# Patient Record
Sex: MALE | Employment: FULL TIME | URBAN - METROPOLITAN AREA
[De-identification: names, ages, dates, MRNs, and addresses within clinical notes are randomized per-mention and may not be internally consistent; named-entity substitution may affect disease eponyms.]

---

## 2023-06-07 ENCOUNTER — MEDICAL CORRESPONDENCE (OUTPATIENT)
Dept: HEALTH INFORMATION MANAGEMENT | Facility: CLINIC | Age: 48
End: 2023-06-07
Payer: COMMERCIAL

## 2023-06-09 ENCOUNTER — MEDICAL CORRESPONDENCE (OUTPATIENT)
Dept: HEALTH INFORMATION MANAGEMENT | Facility: CLINIC | Age: 48
End: 2023-06-09
Payer: COMMERCIAL

## 2023-06-13 ENCOUNTER — TRANSCRIBE ORDERS (OUTPATIENT)
Dept: OTHER | Age: 48
End: 2023-06-13

## 2023-06-13 DIAGNOSIS — I35.0 NONRHEUMATIC AORTIC VALVE STENOSIS: Primary | ICD-10-CM

## 2023-06-13 DIAGNOSIS — I10 HTN (HYPERTENSION): ICD-10-CM

## 2023-06-13 DIAGNOSIS — E78.5 DYSLIPIDEMIA: ICD-10-CM

## 2023-06-13 DIAGNOSIS — I35.0 MODERATE AORTIC VALVE STENOSIS: ICD-10-CM

## 2023-06-13 DIAGNOSIS — Q23.81 BICUSPID AORTIC VALVE: ICD-10-CM

## 2023-08-12 NOTE — PROGRESS NOTES
HPI and Plan:     Reason for Consultation: Aortic stenosis.      History of Presenting Illness: This is a 48-year-old gentleman who presents here for second opinion regarding aortic stenosis.  This was diagnosed in 2022 at which point he underwent an evaluation at [facility].      He underwent a comprehensive cardiac evaluation in early 2022 and was found to have a functionally bicuspid aortic valve on LARRY with severe aortic stenosis and a peak velocity of approximately 5 m/s in the setting of normal left ventricular systolic function.  Moderate regurgitation was also noted.  Of note, his aortic root and ascending thoracic aorta were reported as normal in size.     He also underwent coronary angiography on 1/17/2022 and was found to have nonobstructive coronary artery disease with moderate circumflex disease.  This was a left dominant circulation.    In reviewing the notes from [facility], he had a detailed discussion in February 2022 with cardiology regarding the options for aortic valve replacement.  Given his age, surgical consultation and mechanical aortic valve replacement was recommended.  The patient ultimately decided to pursue watchful waiting, and has been followed up at [facility] in [place] on a regular scheduled basis.      At the time of consultation I did not have the [place] records to review.  Per the patient and his significant other, however, he has undergone echocardiography as recently as in April of this year and his aortic valve stenosis appears to be stable.    He does complain of exertional dyspnea that has been stable for the past several years.  He describes dyspnea upon climbing stairs or performing anything more than mild levels of activity.  The symptoms do not appear to have progressed recently but they do limit his overall exercise capacity.  He denies any syncope, palpitations, PND orthopnea.  He does have sleep apnea and has been using CPAP for the past month or  so.      PMH, FH, SH, Allergies and Meds: As outlined below.    Physical Exam:    Gen: NAD  Respiratory : Clear to Auscultation.  Cardiovascular: RRR, 3/6 systolic ejection murmur at the right upper sternal border.  Extremities: No edema.      Labs: Labs from [facility] on 6/7/2023 were reviewed.  A basic metabolic panel was within normal limits with exception of an elevated creatinine of 1.37.      Lipids demonstrated a total cholesterol of 105, LDL of 53 and triglycerides of 98.  A1c was 6.0.  A CBC demonstrated a hemoglobin of 12.3 and platelets of 223.      He has also undergone a chest x-ray on 6/7/2023 which was within normal limits.    Other Cardiac Testing:          IMPRESSION:    Bicuspid aortic valve with severe aortic stenosis based on a comprehensive cardiac evaluation in early 2022 as described above.    PLAN:    Mr. Talley presents for a second opinion regarding a diagnosis of severe bicuspid aortic valve stenosis that was established back in early 2022.      I went over the pathophysiology and treatment options for bicuspid aortic valve stenosis with him in detail.  Based on his evaluation at [facility], he has severe aortic stenosis which appears to be symptomatic based on his exertional dyspnea.  As we discussed, aortic valve replacement would be a class I indication in this setting.  Given his age and bicuspid aortic valve anatomy, a mechanical aortic valve would be the most appropriate choice and I would not recommend TAVR.    At this point in time we have agreed to reassess his aortic valve stenosis with an echocardiogram.  A CT of the chest will also be performed to assess aortic dimensions.  If his echocardiogram confirms severe aortic stenosis, but I think that diagnostic coronary angiography and surgical referral would be appropriate.    After our discussion the patient expressed his understanding and agreement.  I did discuss the indications, risks and benefits of diagnostic coronary  angiography in this setting with the patient in detail as well and he is agreeable with proceeding if this is indicated.    It was a pleasure seeing him today.    Over 60 minutes today spent pre and post charting, reviewing pertinent previous records as well as imaging studies personally and discussing and coordinating further evaluation for the patient's symptoms.           Ernesto Fox M.D.       CURRENT MEDICATIONS:  Current Outpatient Medications   Medication Sig Dispense Refill    amLODIPine (NORVASC) 10 MG tablet Take 10 mg by mouth daily      aspirin (ASA) 81 MG chewable tablet 81 mg Taking every other day      cholecalciferol 50 MCG (2000 UT) CAPS Take 4,000 Units by mouth      co-enzyme Q-10 100 MG CAPS capsule Take 2 capsules by mouth every evening      Cyanocobalamin 1000 MCG CAPS Take 1,000 mcg by mouth      cyclobenzaprine (FLEXERIL) 10 MG tablet Take 1 Tablet by mouth three times a day as needed for Muscle Spasms or Pain.      ferrous sulfate (FEROSUL) 325 (65 Fe) MG tablet Take 1 tablet by mouth daily      folic acid (FOLVITE) 1 MG tablet Take 1,000 mcg by mouth daily      gabapentin (NEURONTIN) 300 MG capsule Take 300 mg by mouth      lisinopril (ZESTRIL) 20 MG tablet Take 20 mg by mouth daily      metFORMIN (GLUCOPHAGE XR) 500 MG 24 hr tablet Take 1 Tablet by mouth one time a day. Swallow tablet whole; do not crush, divide or chew.      OZEMPIC, 1 MG/DOSE, 4 MG/3ML pen Inject 1 mg Subcutaneous every 7 days      rosuvastatin (CRESTOR) 20 MG tablet Take 20 mg by mouth daily         ALLERGIES   No Known Allergies    PAST MEDICAL HISTORY:    HTN    YOANA    PAST SURGICAL HISTORY:  No past surgical history on file.    FAMILY HISTORY:    + Aortic stenosis    SOCIAL HISTORY:  Social History     Socioeconomic History    Marital status: Patient Declined       Review of Systems:  Skin:  Negative       Eyes:  Negative      ENT:  Negative      Respiratory:  Negative       Cardiovascular:  Negative       Gastroenterology: Negative      Genitourinary:  Negative      Musculoskeletal:  Negative      Neurologic:  Negative      Psychiatric:  Negative      Heme/Lymph/Imm:  Negative      Endocrine:  Positive for diabetes

## 2023-08-14 ENCOUNTER — OFFICE VISIT (OUTPATIENT)
Dept: CARDIOLOGY | Facility: CLINIC | Age: 48
End: 2023-08-14
Payer: COMMERCIAL

## 2023-08-14 ENCOUNTER — TELEPHONE (OUTPATIENT)
Dept: CARDIOLOGY | Facility: CLINIC | Age: 48
End: 2023-08-14

## 2023-08-14 ENCOUNTER — HOSPITAL ENCOUNTER (OUTPATIENT)
Dept: CARDIOLOGY | Facility: CLINIC | Age: 48
Discharge: HOME | End: 2023-08-14
Attending: INTERNAL MEDICINE
Payer: COMMERCIAL

## 2023-08-14 VITALS
OXYGEN SATURATION: 97 % | WEIGHT: 307 LBS | HEART RATE: 60 BPM | DIASTOLIC BLOOD PRESSURE: 88 MMHG | BODY MASS INDEX: 43.95 KG/M2 | SYSTOLIC BLOOD PRESSURE: 148 MMHG | HEIGHT: 70 IN

## 2023-08-14 DIAGNOSIS — I35.0 NONRHEUMATIC AORTIC (VALVE) STENOSIS: ICD-10-CM

## 2023-08-14 DIAGNOSIS — I35.0 NONRHEUMATIC AORTIC (VALVE) STENOSIS: Primary | ICD-10-CM

## 2023-08-14 DIAGNOSIS — Q23.81 BICUSPID AORTIC VALVE: Primary | ICD-10-CM

## 2023-08-14 PROBLEM — E55.9 VITAMIN D DEFICIENCY: Status: ACTIVE | Noted: 2022-08-31

## 2023-08-14 PROBLEM — E78.5 DYSLIPIDEMIA: Status: ACTIVE | Noted: 2022-08-30

## 2023-08-14 PROBLEM — G47.33 MODERATE OBSTRUCTIVE SLEEP APNEA: Status: ACTIVE | Noted: 2022-04-15

## 2023-08-14 PROBLEM — G56.03 BILATERAL CARPAL TUNNEL SYNDROME: Status: ACTIVE | Noted: 2023-06-13

## 2023-08-14 PROBLEM — M54.12 CERVICAL RADICULOPATHY: Status: ACTIVE | Noted: 2021-12-12

## 2023-08-14 PROBLEM — N18.31 STAGE 3A CHRONIC KIDNEY DISEASE (H): Status: ACTIVE | Noted: 2022-08-30

## 2023-08-14 PROBLEM — E11.9 TYPE 2 DIABETES MELLITUS WITHOUT COMPLICATION, WITHOUT LONG-TERM CURRENT USE OF INSULIN (H): Status: ACTIVE | Noted: 2022-08-30

## 2023-08-14 PROBLEM — I10 PRIMARY HYPERTENSION: Status: ACTIVE | Noted: 2022-03-07

## 2023-08-14 LAB
CREAT BLD-MCNC: 1.1 MG/DL (ref 0.7–1.3)
GFR SERPL CREATININE-BSD FRML MDRD: >60 ML/MIN/1.73M2
RADIOLOGIST FLAGS: NORMAL

## 2023-08-14 PROCEDURE — 82565 ASSAY OF CREATININE: CPT

## 2023-08-14 PROCEDURE — 255N000002 HC RX 255 OP 636: Performed by: INTERNAL MEDICINE

## 2023-08-14 PROCEDURE — 999N000208 ECHOCARDIOGRAM COMPLETE

## 2023-08-14 PROCEDURE — 93306 TTE W/DOPPLER COMPLETE: CPT | Mod: 26 | Performed by: INTERNAL MEDICINE

## 2023-08-14 PROCEDURE — 99205 OFFICE O/P NEW HI 60 MIN: CPT | Mod: 25 | Performed by: INTERNAL MEDICINE

## 2023-08-14 PROCEDURE — 250N000011 HC RX IP 250 OP 636: Performed by: INTERNAL MEDICINE

## 2023-08-14 PROCEDURE — 71275 CT ANGIOGRAPHY CHEST: CPT | Mod: 26 | Performed by: INTERNAL MEDICINE

## 2023-08-14 PROCEDURE — 71275 CT ANGIOGRAPHY CHEST: CPT

## 2023-08-14 RX ORDER — METFORMIN HCL 500 MG
TABLET, EXTENDED RELEASE 24 HR ORAL
COMMUNITY

## 2023-08-14 RX ORDER — FERROUS SULFATE 325(65) MG
1 TABLET ORAL DAILY
COMMUNITY
Start: 2023-01-11 | End: 2024-01-11

## 2023-08-14 RX ORDER — CYCLOBENZAPRINE HCL 10 MG
TABLET ORAL
COMMUNITY

## 2023-08-14 RX ORDER — ROSUVASTATIN CALCIUM 20 MG/1
20 TABLET, COATED ORAL DAILY
COMMUNITY

## 2023-08-14 RX ORDER — FOLIC ACID 1 MG/1
1000 TABLET ORAL DAILY
COMMUNITY

## 2023-08-14 RX ORDER — SEMAGLUTIDE 1.34 MG/ML
1 INJECTION, SOLUTION SUBCUTANEOUS
COMMUNITY
Start: 2023-08-11

## 2023-08-14 RX ORDER — LISINOPRIL 20 MG/1
20 TABLET ORAL DAILY
COMMUNITY

## 2023-08-14 RX ORDER — GABAPENTIN 300 MG/1
300 CAPSULE ORAL
COMMUNITY
Start: 2023-05-25 | End: 2024-05-24

## 2023-08-14 RX ORDER — UBIDECARENONE 100 MG
2 CAPSULE ORAL EVERY EVENING
COMMUNITY
Start: 2023-04-28 | End: 2024-04-27

## 2023-08-14 RX ORDER — AMLODIPINE BESYLATE 10 MG/1
10 TABLET ORAL DAILY
COMMUNITY

## 2023-08-14 RX ORDER — IOPAMIDOL 755 MG/ML
500 INJECTION, SOLUTION INTRAVASCULAR ONCE
Status: COMPLETED | OUTPATIENT
Start: 2023-08-14 | End: 2023-08-14

## 2023-08-14 RX ORDER — ASPIRIN 81 MG/1
81 TABLET, CHEWABLE ORAL
COMMUNITY
Start: 2022-01-12

## 2023-08-14 RX ADMIN — IOPAMIDOL 100 ML: 755 INJECTION, SOLUTION INTRAVENOUS at 12:21

## 2023-08-14 RX ADMIN — HUMAN ALBUMIN MICROSPHERES AND PERFLUTREN 3 ML: 10; .22 INJECTION, SOLUTION INTRAVENOUS at 13:08

## 2023-08-14 NOTE — TELEPHONE ENCOUNTER
Call from patient. He has completed his echo and CTA ordered today. He has a 3 hour drive home. Patient wants to know if he is going to need a coronary angiogram within 1-2 days? If so, he will stay in town. If the cath is not until next week, he will go home and come back.    Echo and CTA are in process    Message Dr. Fox to review      1410 per Dr. Fox's recommendation:  Needs CV surgery consult for future aortic valve surgery and aorta repair  Needs diagnostic cath for abnormal imaging (H&P completed at [facility] today)    Per scheduling team, cath schedule is fully booked for this week. Called patient with update. He will head for home for now.

## 2023-08-14 NOTE — TELEPHONE ENCOUNTER
CTA and echo completed today as below, testing was ordered by Dr Fox today after consult for biscuspid AV w/ severe symptomatic aortic stenosis. Diagnostic angiogram was also ordered, to be scheduled. Routed to provider to review.     CTA chest-   1. Moderately dilated aortic root.   2. Bicuspid aortic valve with significant valvular calcification present  3. The ascending aorta, aortic arch and descending thoracic aorta are normal in dimension.   4. Trivial calcification noted in the first obtuse marginal artery and in the nondominant right coronary artery.    Echo-  The calculated aortic valve area is 0.8cm2.  The mean AoV pressure gradient is 41mmHg.  Calcified mitral apparatus.  The study was technically difficult. There is no comparison study available.

## 2023-08-14 NOTE — LETTER
8/14/2023    Cesilia Thompson  [address] [place] [id number]    RE: Domingo Talley       Dear Colleague,     I had the pleasure of seeing Domingo VALDEMAR Talley in the [facility].  HPI and Plan:     Reason for Consultation: Aortic stenosis.      History of Presenting Illness: This is a 48-year-old gentleman who presents here for second opinion regarding aortic stenosis.  This was diagnosed in 2022 at which point he underwent an evaluation at [facility].      He underwent a comprehensive cardiac evaluation in early 2022 and was found to have a functionally bicuspid aortic valve on LARRY with severe aortic stenosis and a peak velocity of approximately 5 m/s in the setting of normal left ventricular systolic function.  Moderate regurgitation was also noted.  Of note, his aortic root and ascending thoracic aorta were reported as normal in size.     He also underwent coronary angiography on 1/17/2022 and was found to have nonobstructive coronary artery disease with moderate circumflex disease.  This was a left dominant circulation.    In reviewing the notes from [facility], he had a detailed discussion in February 2022 with cardiology regarding the options for aortic valve replacement.  Given his age, surgical consultation and mechanical aortic valve replacement was recommended.  The patient ultimately decided to pursue watchful waiting, and has been followed up at [facility] in [place] on a regular scheduled basis.      At the time of consultation I did not have the [place] records to review.  Per the patient and his significant other, however, he has undergone echocardiography as recently as in April of this year and his aortic valve stenosis appears to be stable.    He does complain of exertional dyspnea that has been stable for the past several years.  He describes dyspnea upon climbing stairs or performing anything more than mild levels of activity.  The symptoms do not  appear to have progressed recently but they do limit his overall exercise capacity.  He denies any syncope, palpitations, PND orthopnea.  He does have sleep apnea and has been using CPAP for the past month or so.      PMH, FH, SH, Allergies and Meds: As outlined below.    Physical Exam:    Gen: NAD  Respiratory : Clear to Auscultation.  Cardiovascular: RRR, 3/6 systolic ejection murmur at the right upper sternal border.  Extremities: No edema.      Labs: Labs from [facility] on 6/7/2023 were reviewed.  A basic metabolic panel was within normal limits with exception of an elevated creatinine of 1.37.      Lipids demonstrated a total cholesterol of 105, LDL of 53 and triglycerides of 98.  A1c was 6.0.  A CBC demonstrated a hemoglobin of 12.3 and platelets of 223.      He has also undergone a chest x-ray on 6/7/2023 which was within normal limits.    Other Cardiac Testing:          IMPRESSION:    Bicuspid aortic valve with severe aortic stenosis based on a comprehensive cardiac evaluation in early 2022 as described above.    PLAN:    Mr. Talley presents for a second opinion regarding a diagnosis of severe bicuspid aortic valve stenosis that was established back in early 2022.      I went over the pathophysiology and treatment options for bicuspid aortic valve stenosis with him in detail.  Based on his evaluation at [facility], he has severe aortic stenosis which appears to be symptomatic based on his exertional dyspnea.  As we discussed, aortic valve replacement would be a class I indication in this setting.  Given his age and bicuspid aortic valve anatomy, a mechanical aortic valve would be the most appropriate choice and I would not recommend TAVR.    At this point in time we have agreed to reassess his aortic valve stenosis with an echocardiogram.  A CT of the chest will also be performed to assess aortic dimensions.  If his echocardiogram confirms severe aortic stenosis, but I think that diagnostic coronary  angiography and surgical referral would be appropriate.    After our discussion the patient expressed his understanding and agreement.  I did discuss the indications, risks and benefits of diagnostic coronary angiography in this setting with the patient in detail as well and he is agreeable with proceeding if this is indicated.    It was a pleasure seeing him today.    Over 60 minutes today spent pre and post charting, reviewing pertinent previous records as well as imaging studies personally and discussing and coordinating further evaluation for the patient's symptoms.           Ernesto Fox M.D.       CURRENT MEDICATIONS:  Current Outpatient Medications   Medication Sig Dispense Refill    amLODIPine (NORVASC) 10 MG tablet Take 10 mg by mouth daily      aspirin (ASA) 81 MG chewable tablet 81 mg Taking every other day      cholecalciferol 50 MCG (2000 UT) CAPS Take 4,000 Units by mouth      co-enzyme Q-10 100 MG CAPS capsule Take 2 capsules by mouth every evening      Cyanocobalamin 1000 MCG CAPS Take 1,000 mcg by mouth      cyclobenzaprine (FLEXERIL) 10 MG tablet Take 1 Tablet by mouth three times a day as needed for Muscle Spasms or Pain.      ferrous sulfate (FEROSUL) 325 (65 Fe) MG tablet Take 1 tablet by mouth daily      folic acid (FOLVITE) 1 MG tablet Take 1,000 mcg by mouth daily      gabapentin (NEURONTIN) 300 MG capsule Take 300 mg by mouth      lisinopril (ZESTRIL) 20 MG tablet Take 20 mg by mouth daily      metFORMIN (GLUCOPHAGE XR) 500 MG 24 hr tablet Take 1 Tablet by mouth one time a day. Swallow tablet whole; do not crush, divide or chew.      OZEMPIC, 1 MG/DOSE, 4 MG/3ML pen Inject 1 mg Subcutaneous every 7 days      rosuvastatin (CRESTOR) 20 MG tablet Take 20 mg by mouth daily         ALLERGIES   No Known Allergies    PAST MEDICAL HISTORY:    HTN    YOANA    PAST SURGICAL HISTORY:  No past surgical history on file.    FAMILY HISTORY:    + Aortic stenosis    SOCIAL HISTORY:  Social History      Socioeconomic History    Marital status: Patient Declined       Review of Systems:  Skin:  Negative       Eyes:  Negative      ENT:  Negative      Respiratory:  Negative       Cardiovascular:  Negative      Gastroenterology: Negative      Genitourinary:  Negative      Musculoskeletal:  Negative      Neurologic:  Negative      Psychiatric:  Negative      Heme/Lymph/Imm:  Negative      Endocrine:  Positive for diabetes                    Thank you for allowing me to participate in the care of your patient.      Sincerely,     Ernesto Fox MD     [facility]  cc:   No referring provider defined for this encounter.

## 2023-08-15 ENCOUNTER — HOSPITAL ENCOUNTER (OUTPATIENT)
Facility: CLINIC | Age: 48
End: 2023-08-15
Payer: COMMERCIAL

## 2023-08-15 DIAGNOSIS — Q23.81 BICUSPID AORTIC VALVE: ICD-10-CM

## 2023-08-15 NOTE — TELEPHONE ENCOUNTER
Previous results reviewed and doned by Dr Fox. Soft tissue report now available, routed to provider to review.     IMPRESSION:   1.  Approximately 4 mm solid pulmonary nodule of the right upper lobe.  CT follow-up recommendations below.  2.  Sequela of old granulomatous disease.    SINGLE NODULE  Nodule size <6 mm  Low-risk patients: No follow-up needed.  High-risk patients: Optional follow-up at 12 months.

## 2023-08-16 ENCOUNTER — TELEPHONE (OUTPATIENT)
Dept: CARDIOLOGY | Facility: CLINIC | Age: 48
End: 2023-08-16
Payer: COMMERCIAL

## 2023-08-16 DIAGNOSIS — Q23.81 BICUSPID AORTIC VALVE: Primary | ICD-10-CM

## 2023-08-16 NOTE — TELEPHONE ENCOUNTER
Message from scheduling team re: cath on 8/31:  Umesh Coronado Barbara E, RN  Done. Patient will need a call re: post angio BMP related to metformin. He'd like to do that in [place].        Spoke with patient. Due to the timing of his procedure and the holiday weekend, he will try to schedule the bmp on 9/2/2023 (Saturday) but if that is not a choice then will schedule on 9/5/2023.  Order for bmp faxed to [facility] @ [phone].  Copy mailed to the patient to take to the visit. He will call for an appointment.

## 2023-08-25 ENCOUNTER — TELEPHONE (OUTPATIENT)
Dept: CARDIOLOGY | Facility: CLINIC | Age: 48
End: 2023-08-25
Payer: COMMERCIAL

## 2023-08-25 NOTE — TELEPHONE ENCOUNTER
Attempted to contact patient to review prep for angiogram on 8/31/2023. Patient was to schedule his follow up bmp at his local clinic - need to confirm that date.    Spoke with patient, he has decided to have his angiogram in [place], so he called and canceled his procedure and follow up at this facility.    Message sent to scheduling team with update.

## 2023-10-22 ENCOUNTER — HEALTH MAINTENANCE LETTER (OUTPATIENT)
Age: 48
End: 2023-10-22

## 2024-03-10 ENCOUNTER — HEALTH MAINTENANCE LETTER (OUTPATIENT)
Age: 49
End: 2024-03-10

## 2024-07-28 ENCOUNTER — HEALTH MAINTENANCE LETTER (OUTPATIENT)
Age: 49
End: 2024-07-28

## 2024-12-15 ENCOUNTER — HEALTH MAINTENANCE LETTER (OUTPATIENT)
Age: 49
End: 2024-12-15

## 2025-03-16 ENCOUNTER — HEALTH MAINTENANCE LETTER (OUTPATIENT)
Age: 50
End: 2025-03-16

## 2025-06-29 ENCOUNTER — HEALTH MAINTENANCE LETTER (OUTPATIENT)
Age: 50
End: 2025-06-29